# Patient Record
(demographics unavailable — no encounter records)

---

## 2022-09-28 DIAGNOSIS — Z30.09 FAMILY PLANNING: Primary | ICD-10-CM

## 2022-10-18 DIAGNOSIS — Z31.69 INFERTILITY COUNSELING: ICD-10-CM

## 2022-10-18 DIAGNOSIS — Z30.09 FAMILY PLANNING: Primary | ICD-10-CM

## 2022-10-20 NOTE — PROGRESS NOTES
Normal, reviewed  [employer] message sent if active. Notify: semen analysis wnl except low morphology (strict criteria for shape), ok to observe.